# Patient Record
Sex: MALE | Race: BLACK OR AFRICAN AMERICAN | Employment: FULL TIME | URBAN - METROPOLITAN AREA
[De-identification: names, ages, dates, MRNs, and addresses within clinical notes are randomized per-mention and may not be internally consistent; named-entity substitution may affect disease eponyms.]

---

## 2017-05-02 ENCOUNTER — HOSPITAL ENCOUNTER (EMERGENCY)
Age: 32
Discharge: HOME | End: 2017-05-02
Attending: EMERGENCY MEDICINE
Payer: SELF-PAY

## 2017-05-02 VITALS
OXYGEN SATURATION: 100 % | HEIGHT: 69 IN | DIASTOLIC BLOOD PRESSURE: 75 MMHG | WEIGHT: 180 LBS | HEART RATE: 87 BPM | TEMPERATURE: 97.8 F | RESPIRATION RATE: 17 BRPM | SYSTOLIC BLOOD PRESSURE: 114 MMHG | BODY MASS INDEX: 26.66 KG/M2

## 2017-05-02 DIAGNOSIS — L02.91 ABSCESS: Primary | ICD-10-CM

## 2017-05-02 PROCEDURE — 99283 EMERGENCY DEPT VISIT LOW MDM: CPT | Performed by: EMERGENCY MEDICINE

## 2017-05-02 RX ORDER — SULFAMETHOXAZOLE AND TRIMETHOPRIM 800; 160 MG/1; MG/1
1 TABLET ORAL 2 TIMES DAILY
Qty: 14 TAB | Refills: 0 | Status: SHIPPED | OUTPATIENT
Start: 2017-05-02 | End: 2017-05-09

## 2017-05-02 NOTE — ED PROVIDER NOTES
HPI Comments: 19-year-old gentleman with a history of redness with a mild drainage to the left lower surface of his abdomen. Patient says it has been mildly swollen and has started to drain. He says it is a little red around the area but very painful. Patient says he's never had this kind of problem before. Overall he rates his pain as a 10 out of 10 and says nothing has helped and touching it makes it worse. Elements of this note were created using speech recognition software. As such, errors of speech recognition may be present. Patient is a 27 y.o. male presenting with abscess. The history is provided by the patient. Abscess           History reviewed. No pertinent past medical history. History reviewed. No pertinent surgical history. History reviewed. No pertinent family history. Social History     Social History    Marital status: SINGLE     Spouse name: N/A    Number of children: N/A    Years of education: N/A     Occupational History    Not on file. Social History Main Topics    Smoking status: Not on file    Smokeless tobacco: Not on file    Alcohol use No    Drug use: No    Sexual activity: Not on file     Other Topics Concern    Not on file     Social History Narrative    No narrative on file         ALLERGIES: Review of patient's allergies indicates no known allergies. Review of Systems   Constitutional: Negative. Cardiovascular: Negative. Gastrointestinal: Negative. Skin: Positive for color change and wound. Neurological: Negative. Vitals:    05/02/17 1452   BP: 126/82   Pulse: 97   Resp: 18   Temp: 97.8 °F (36.6 °C)   SpO2: 97%   Weight: 81.6 kg (180 lb)   Height: 5' 9\" (1.753 m)            Physical Exam   Constitutional: He is oriented to person, place, and time. He appears well-developed and well-nourished. Abdominal: There is no tenderness. There is no rebound and no guarding.    Neurological: He is alert and oriented to person, place, and time. Skin:   Quarter-sized developing abscess on the left lower quadrant of his abdomen that is spontaneously draining. I did express some more pus from it. Nursing note and vitals reviewed. MDM  Number of Diagnoses or Management Options  Abscess:   Diagnosis management comments: Since it is spontaneously draining I will encourage him to allow it to continue to drain and prescribe some antibiotic.     ED Course       Procedures

## 2017-05-02 NOTE — ED TRIAGE NOTES
Pt states having an abscess on the lower left of his abdominal wall. Pt states it has been there for a week. Site is red, swollen and warm.

## 2017-05-02 NOTE — DISCHARGE INSTRUCTIONS
Return with any fevers, vomiting, spreading redness, worsening symptoms, or additional concerns. To the  abscess on your waist, apply a warm compress and a thin amount of a 10% benzoyl peroxide (ZapZyt) cream twice daily. Follow up with a primary care doctor for further care as needed.

## 2017-05-02 NOTE — LETTER
NOTIFICATION RETURN TO WORK / SCHOOL 
 
5/2/2017 4:09 PM 
 
Mr. Glynn Mayfield. [facility] [id number] To Whom It May Concern: 
 
Winsome Diggs is currently under the care of [facility] EMERGENCY DEPT. He will return to work/school on: 5-4-17 If there are questions or concerns please have the patient contact our office. Sincerely, Sherif Torres RN